# Patient Record
Sex: FEMALE | Race: WHITE | NOT HISPANIC OR LATINO | Employment: STUDENT | URBAN - METROPOLITAN AREA
[De-identification: names, ages, dates, MRNs, and addresses within clinical notes are randomized per-mention and may not be internally consistent; named-entity substitution may affect disease eponyms.]

---

## 2017-08-30 ENCOUNTER — OFFICE VISIT (OUTPATIENT)
Dept: OBSTETRICS AND GYNECOLOGY | Facility: CLINIC | Age: 27
End: 2017-08-30
Payer: COMMERCIAL

## 2017-08-30 VITALS
WEIGHT: 175.94 LBS | BODY MASS INDEX: 27.61 KG/M2 | SYSTOLIC BLOOD PRESSURE: 122 MMHG | HEIGHT: 67 IN | DIASTOLIC BLOOD PRESSURE: 76 MMHG

## 2017-08-30 DIAGNOSIS — Z30.41 ENCOUNTER FOR SURVEILLANCE OF CONTRACEPTIVE PILLS: ICD-10-CM

## 2017-08-30 DIAGNOSIS — E28.2 PCOS (POLYCYSTIC OVARIAN SYNDROME): ICD-10-CM

## 2017-08-30 DIAGNOSIS — Z01.419 ENCOUNTER FOR GYNECOLOGICAL EXAMINATION: Primary | ICD-10-CM

## 2017-08-30 PROCEDURE — 99395 PREV VISIT EST AGE 18-39: CPT | Mod: S$GLB,,, | Performed by: OBSTETRICS & GYNECOLOGY

## 2017-08-30 PROCEDURE — 99999 PR PBB SHADOW E&M-EST. PATIENT-LVL III: CPT | Mod: PBBFAC,,, | Performed by: OBSTETRICS & GYNECOLOGY

## 2017-08-30 RX ORDER — METFORMIN HYDROCHLORIDE 750 MG/1
750 TABLET, EXTENDED RELEASE ORAL
Qty: 30 TABLET | Refills: 11 | Status: SHIPPED | OUTPATIENT
Start: 2017-08-30 | End: 2018-09-20 | Stop reason: SDUPTHER

## 2017-08-30 RX ORDER — DROSPIRENONE AND ETHINYL ESTRADIOL 0.02-3(28)
1 KIT ORAL DAILY
Qty: 90 TABLET | Refills: 3 | Status: SHIPPED | OUTPATIENT
Start: 2017-08-30 | End: 2017-09-01 | Stop reason: SDUPTHER

## 2017-08-30 NOTE — PROGRESS NOTES
Subjective:       Patient ID: Aleisha Corrales is a 27 y.o. female.    Chief Complaint:  Well Woman (last pap negative 16, pt c/o migraines around the same time of menses for the last year)      History of Present Illness.  Aleisha Corrales is a 27 y.o. female.  She has no breast or urinary symptoms.  She has no postcoital bleeding, dysmenorrhea, pelvic pain, dyspareunia, vaginal dryness, vaginal discharge, or sexual complaints.  She is on Massiel and doesn't get a period but feels like she gets migraines when her period is due.  She does not take it continuously.    GYN & OB History  No LMP recorded. Patient is not currently having periods (Reason: Birth Control).   Date of Last Pap: 2016  Normal  Gardasil: Yes    OB History    Para Term  AB Living   0 0 0 0 0 0   SAB TAB Ectopic Multiple Live Births   0 0 0 0           Obstetric Comments   Age at menarche 13       Past Medical History:   Diagnosis Date    Migraine     PCOS (polycystic ovarian syndrome)      History reviewed. No pertinent surgical history.  Family History   Problem Relation Age of Onset    Breast cancer Maternal Grandmother     No Known Problems Father     Thyroid disease Mother     Parkinsonism Paternal Grandfather     Polymyalgia rheumatica Paternal Grandmother     Colon cancer Neg Hx     Diabetes Neg Hx     Eclampsia Neg Hx     Hypertension Neg Hx     Miscarriages / Stillbirths Neg Hx     Ovarian cancer Neg Hx      labor Neg Hx     Stroke Neg Hx      Social History   Substance Use Topics    Smoking status: Never Smoker    Smokeless tobacco: Never Used    Alcohol use Yes       Current Outpatient Prescriptions:     drospirenone-ethinyl estradiol (GIANVI, 28,) 3-0.02 mg per tablet, Take 1 tablet by mouth once daily. Do 4 months continuously, Disp: 90 tablet, Rfl: 3    LEVOCETIRIZINE DIHYDROCHLORIDE (LEVOCETIRIZINE ORAL), Take by mouth., Disp: , Rfl:     rizatriptan (MAXALT-MLT) 5 MG disintegrating tablet,  "once a day, Disp: , Rfl:     metformin (GLUCOPHAGE XR) 750 MG 24 hr tablet, Take 1 tablet (750 mg total) by mouth daily with breakfast., Disp: 30 tablet, Rfl: 11    Review of patient's allergies indicates:  No Known Allergies    Review of Systems  Review of Systems   Constitutional: Negative for fatigue.   HENT: Negative for trouble swallowing.    Eyes: Negative for visual disturbance.   Respiratory: Negative for cough and shortness of breath.    Cardiovascular: Negative for chest pain.   Gastrointestinal: Negative for abdominal distention, abdominal pain, blood in stool, nausea and vomiting.   Genitourinary: Negative for difficulty urinating, dyspareunia, dysuria, flank pain, frequency, hematuria, pelvic pain, urgency, vaginal bleeding, vaginal discharge and vaginal pain.   Musculoskeletal: Negative for arthralgias.   Skin: Negative for rash.   Neurological: Negative for dizziness and headaches.   Psychiatric/Behavioral: Negative for sleep disturbance. The patient is not nervous/anxious.         Objective:     Vitals:    08/30/17 1003   BP: 122/76   Weight: 79.8 kg (175 lb 14.8 oz)   Height: 5' 7" (1.702 m)   PainSc: 0-No pain     Body mass index is 27.55 kg/m².      Physical Exam:   Constitutional: She is oriented to person, place, and time. Vital signs are normal. She appears well-developed and well-nourished.    HENT:   Head: Normocephalic.     Neck: Normal range of motion. No thyromegaly present.     Pulmonary/Chest: Right breast exhibits no mass, no nipple discharge, no skin change, no tenderness and no swelling. Left breast exhibits no mass, no nipple discharge, no skin change, no tenderness and no swelling. Breasts are symmetrical.        Abdominal: Soft. Normal appearance and bowel sounds are normal. She exhibits no distension. There is no tenderness.     Genitourinary: Vagina normal and uterus normal. Pelvic exam was performed with patient supine. There is no rash, tenderness, lesion or injury on the " right labia. There is no rash, tenderness, lesion or injury on the left labia. Cervix is normal. Right adnexum displays no mass, no tenderness and no fullness. Left adnexum displays no mass, no tenderness and no fullness. No erythema in the vagina. No vaginal discharge found. Cervix exhibits no motion tenderness and no discharge.           Musculoskeletal: Normal range of motion.      Lymphadenopathy:        Right: No inguinal and no supraclavicular adenopathy present.        Left: No inguinal and no supraclavicular adenopathy present.    Neurological: She is alert and oriented to person, place, and time.    Skin: Skin is warm and dry.    Psychiatric: She has a normal mood and affect.        Assessment/ Plan:     Encounter for gynecological examination    Encounter for surveillance of contraceptive pills  -     drospirenone-ethinyl estradiol (GIANVI, 28,) 3-0.02 mg per tablet; Take 1 tablet by mouth once daily. Do 4 months continuously  Dispense: 90 tablet; Refill: 3    PCOS (polycystic ovarian syndrome)  -     metformin (GLUCOPHAGE XR) 750 MG 24 hr tablet; Take 1 tablet (750 mg total) by mouth daily with breakfast.  Dispense: 30 tablet; Refill: 11      Take Massiel continuously to see if that helps migraines.  Routine pap smears.  Self breast exam  Diet and exercise discussed.  Contraception reviewed/discussed.  Follow-up with me in 1 year.

## 2017-09-01 ENCOUNTER — TELEPHONE (OUTPATIENT)
Dept: OBSTETRICS AND GYNECOLOGY | Facility: CLINIC | Age: 27
End: 2017-09-01

## 2017-09-01 DIAGNOSIS — Z30.41 ENCOUNTER FOR SURVEILLANCE OF CONTRACEPTIVE PILLS: ICD-10-CM

## 2017-09-01 RX ORDER — DROSPIRENONE AND ETHINYL ESTRADIOL 0.02-3(28)
1 KIT ORAL DAILY
Qty: 28 TABLET | Refills: 0 | Status: SHIPPED | OUTPATIENT
Start: 2017-09-01

## 2017-09-01 NOTE — TELEPHONE ENCOUNTER
Pt said her pharm won't have her ocp rx until the end of next week, pt would like to know if it is okay to wait until then to start her new pack. She is supposed to start it on Monday.

## 2017-09-01 NOTE — TELEPHONE ENCOUNTER
Pt states that [facility] is shipping out her new pack on Monday but it won't get to her until the 6th. Pt is supposed to start her new pack on Monday. Advised pt that we can send in a one month supply to her local pharmacy so that she is not missing a whole week of pills. Pt verbalized understanding and pharmacy and allergies are UTD

## 2018-02-21 ENCOUNTER — TELEPHONE (OUTPATIENT)
Dept: NEUROLOGY | Facility: CLINIC | Age: 28
End: 2018-02-21

## 2018-02-21 NOTE — TELEPHONE ENCOUNTER
----- Message from Adwoa Goodrich sent at 2/21/2018  8:29 AM CST -----  Contact: self  Patient would like a call back from Elsa regarding a appointment. Please call patient at [phone]. Thanks!

## 2018-02-21 NOTE — TELEPHONE ENCOUNTER
Returned call and spoke with patient. Appointment scheduled with Dr. Beauchamp for 03/02 at 12 pm per Dr. Beauchamp. Patient verbalized understanding.

## 2018-03-02 ENCOUNTER — TELEPHONE (OUTPATIENT)
Dept: NEUROLOGY | Facility: CLINIC | Age: 28
End: 2018-03-02

## 2018-03-02 ENCOUNTER — OFFICE VISIT (OUTPATIENT)
Dept: NEUROLOGY | Facility: CLINIC | Age: 28
End: 2018-03-02
Payer: COMMERCIAL

## 2018-03-02 VITALS
RESPIRATION RATE: 16 BRPM | WEIGHT: 179.13 LBS | BODY MASS INDEX: 28.11 KG/M2 | DIASTOLIC BLOOD PRESSURE: 90 MMHG | HEART RATE: 77 BPM | SYSTOLIC BLOOD PRESSURE: 135 MMHG | HEIGHT: 67 IN

## 2018-03-02 DIAGNOSIS — G44.89 CHRONIC MIXED HEADACHE SYNDROME: ICD-10-CM

## 2018-03-02 PROCEDURE — 99999 PR PBB SHADOW E&M-EST. PATIENT-LVL III: CPT | Mod: PBBFAC,,, | Performed by: PSYCHIATRY & NEUROLOGY

## 2018-03-02 PROCEDURE — 99204 OFFICE O/P NEW MOD 45 MIN: CPT | Mod: S$GLB,,, | Performed by: PSYCHIATRY & NEUROLOGY

## 2018-03-02 RX ORDER — INDOMETHACIN 25 MG/1
25 CAPSULE ORAL 3 TIMES DAILY PRN
Qty: 10 CAPSULE | Refills: 3 | Status: SHIPPED | OUTPATIENT
Start: 2018-03-02

## 2018-03-02 RX ORDER — RIZATRIPTAN BENZOATE 10 MG/1
10 TABLET, ORALLY DISINTEGRATING ORAL
Qty: 9 TABLET | Refills: 4 | Status: SHIPPED | OUTPATIENT
Start: 2018-03-02 | End: 2018-06-05 | Stop reason: SDUPTHER

## 2018-03-02 NOTE — TELEPHONE ENCOUNTER
----- Message from Bárbara Trinh sent at 3/2/2018 10:08 AM CST -----  Aleisha, patient [phone], Returning the nurse's call about changing appointment to 1:30pm. She is confirming the change to 1:30pm, today. Please advise. Thanks.

## 2018-03-02 NOTE — PROGRESS NOTES
Headache questionnaire    1. When did your Headaches start?    14 years old      2. Where are your headaches located?   Eyes, mastoid process, top of head      3. Your headache's characteristics:   Excruciating,Stabbing      4. How long does the headache last?   hours      5. How often does the headache occur?   weekly      6. Are your headaches preceded or accompanied by other symptoms? yes   If yes, please describe.  See below      7. Does the headache awaken you at night? no   If so, how often? n/a        8. Please shanelle the word that best describes your headache's intensity:    severe      9. Using a scale of 1 through 10, with 0 = no pain and 10 = the worst pain:   What score is your headache now? 0   What score is your headache at its worst? 8   What score is your headache at its best? 0        10. Possible associated headache symptoms:  []  Sensitivity to light  [] Dizziness  [x] Nasal or sinus pressure/ pain   [] Sensitivity to noise  [] Vertigo  [x] Problems with concentration  [x] Sensitivity to smells  [] Ringing in ears  [] Problems with memory    [] Blurred vision  [] Irritability  [x] Problems with task completion   [] Double vision  [] Anger  []  Problems with relaxation  [] Loss of appetite  [] Anxiety  [x] Neck tightness, Neck pain  [x] Nausea   [] Nasal congestion  [x] Vomiting         11. Headache improving factors:  [x] Sleep  [] Heat  [] Darkness  [] Ice  [x] Local pressure [] Menses (period)  [x] Massage   [x] Medications:        12. Headache worsening factors:   [] Fatigue [] Sneezing  [] Changes in Weather  [] Light [] Bending Over [x] Stress  [] Noise [] Ovulation  [] Multiple Sclerosis Flare-Up  [] Smells  [x] Menses  [] Food   [] Coughing [x] Alcohol      13. Number of caffeinated drinks per day: 1-2      14. Number of diet drinks per day:  Sometimes 1      15. Have you seen any other [facility]ists within the last 3 years?  No          Please Check any Medications Tried for  Headache    AED Neuromodulators  MAOIs  Ergot Alkaloids    Acetazolamide (Diamox) [] Phenelzine (Nardil) [] Dihydroergotamine (Migranal) []   Carbamazepine (Tegretol) [] Tranylcypromine (Parnate) [] Ergotamine (Ergomar) []   Gabapentin (Neurontin) [] Antihistamine/Serotonergic  Triptans    Lacosamide (Vimpat) [] Cyproheptadine (Periactin) [] Almotriptan (Axert) []   Lamotrigine (Lamictal) [] Antihypertensives  Eletriptan (Relpax) []   Levatiracetam (Keppra) [] Atenolol (Tenormin) [] Frovatriptan (Frova) []   Oxcarbazepine (Trileptal) [] Bisoprostol (Zebeta) [] Naratriptan (Amerge) []   Phenobarbital [] Candesartan (Atacand) [] Rizatriptan (Maxalt) [x]   Phenytoin (Dilantin) [] Diltiazem (Cardizem) [] Sumatriptan (Imitrex) [x]   Pregabalin (Lyrica) [] Lisinopril (Prinivil, Zestril) [] Zolmitriptan (Zomig) []   Primidone (Mysoline) [] Metoprolol (Toprol) [] Combo Abortives    Tiagabine (Gabatril) [] Nadolol (Corgard) [] Butalbital and Acetaminophen (Bupap) []   Topiramate (Topamax)  (Trokendi) [] Nicardipine (Cardene) []     Vigabatrin (Sabril) [] Nimodipine (Nimotop) [] Butalbital, Acetaminophen, and caffeine (Fioricet) []   Valproic Acid (Depakote) (Divalproex Sodium) [] Propranolol (Inderal) []     Zonisamide (Zonegran) [] Telmisartan (Micardis) [] Butalbital, Aspirin, and caffeine (Fiorinal) []   Benzodiazepines  Timolol (Blocadren) []     Alprazolam (Xanax) [] Verapamil (Calan, Verelan) [] Butalbital, Caffeine, Acetaminophen, and Codeine (Fioricet with Codeine) []   Diazepam (Valium) [] NSAIDs      Lorazepam (Ativan) [] Acetaminophen (Tylenol) [x]     Clonazepam (Klonopin) [] Acetylsalicylic Acid (Aspirin) [] Butalbital, Caffeine, Aspirin, and Codeine  (Fiorinal with Codeine) []   Antidepressants  Diclofenac (Cambia) []     Amitriptyline (Elavil) [] Ibuprofen (Motrin) [x]     Desipramine (Norpramin) [] Indomethacin (Indocin) [] Aspirin, Caffeine, and Acetaminophen (Excedrin) (Goodys) [x]   Doxepin (Sinequan) []  Ketoprofen (Orudis) []     Fluoxetine (Prozac) [] Ketorolac (Toradol) [] Acetaminophen, Dichloralphenazone, and Isometheptene (Midrin) []   Imipramine (Tofranil) [] Naproxen (Anaprox) (Aleve) [x]     Nortriptyline (Pamelor) [] Meclofenamic Acid (Meclomen) []     Venlafaxine (Effexor) [] Meloxicam (Mobic) [] Aspirin, Salicylamide, and Caffeine (BC Powder) []

## 2018-03-02 NOTE — PATIENT INSTRUCTIONS
MRI of the brain W WO contrast    Increase Maxalt to 10 mg instead of 5 mg    Trial of Zomig NS 5 mg for rapid escalating headaches    Indocin 25 mg with snack for rapidly escalating headaches    May combine Indocin with both, Zomig or Maxalt for severe headaches    Wait 24 hours after trying Zomig to use Maxalt or after using Maxalt to try Zomig

## 2018-03-02 NOTE — PROGRESS NOTES
"Subjective:       Patient ID: Aleisha Corrales is a 27 y.o. female.    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 She has had chronic headaches since was 14 years old. She describes several types of headaches. She was diagnosed with migraine at the age of 20. At the time, the attacks occurred less than 1 per month, were associated with neck stiffness and pain in the shoulders as well as nausea. She identified stress as the main trigger. Later, her OB/Gyn recommended to take OCP continuously, skipping the placebo week. This had no effect on her headaches. For about a year, her migraines changed in frequency, having 1 to 5 attacks per month, and were more sudden as well as unilateral usually on the right side rarely on the left. She denies any autonomic symptoms or signs. The quality of the pain also changed to stabbing pain in the eye, pain around the mastoid process, and no correlation with stress. No preceding signs or symptoms. The headaches would come in "clusters" lasting a few days in a row.The duration of the headache is a few hours (with treatment).The last type of headache has been present for the last couple of months and it consists of pain referred to the vertex. Tylenol, Ibuprofen, Naproxen, Excedrin do not help. Maxalt 5 mg helps partially in that it decreases the pain so she can function. Her PMHx is significant for PCOS diagnosed in 2009 on Metformin. No recent changes in lifestyle or medications. She underwent chiropractic treatment about 2 years ago which helped with tension type headache but not migraine. There is strong positive family history in her paternal side affecting her mother, grandmother and cousin.    Please see details of headache characteristics below.  Headache questionnaire     1. When did your Headaches start?               14 years old        2. Where are your headaches located?         "      Eyes, mastoid process, top of head        3. Your headache's characteristics:              Excruciating,Stabbing        4. How long does the headache last?              hours        5. How often does the headache occur?              weekly        6. Are your headaches preceded or accompanied by other symptoms? yes              If yes, please describe.  See below        7. Does the headache awaken you at night? no              If so, how often? n/a           8. Please shanelle the word that best describes your headache's intensity:               severe        9. Using a scale of 1 through 10, with 0 = no pain and 10 = the worst pain:              What score is your headache now? 0              What score is your headache at its worst? 8              What score is your headache at its best? 0           10. Possible associated headache symptoms:  []  Sensitivity to light                  [] Dizziness                    [x] Nasal or sinus pressure/ pain              [] Sensitivity to noise                 [] Vertigo                        [x] Problems with concentration  [x] Sensitivity to smells   [] Ringing in ears           [] Problems with memory                        [] Blurred vision             [] Irritability                     [x] Problems with task completion   [] Double vision             [] Anger              []  Problems with relaxation  [] Loss of appetite                     [] Anxiety                       [x] Neck tightness, Neck pain  [x] Nausea                                   [] Nasal congestion  [x] Vomiting                                         11. Headache improving factors:  [x] Sleep              [] Heat  [] Darkness                    [] Ice  [x] Local pressure           [] Menses (period)  [x] Massage                    [x] Medications:          12. Headache worsening factors:   [] Fatigue           [] Sneezing                    [] Changes in Weather  [] Light   [] Bending Over [x]  Stress  [] Noise  [] Ovulation                    [] Multiple Sclerosis Flare-Up  [] Smells            [x] Menses                      [] Food   [] Coughing        [x] Alcohol        13. Number of caffeinated drinks per day: 1-2        14. Number of diet drinks per day:  Sometimes 1        Review of Systems   Constitutional: Positive for fatigue. Negative for activity change, appetite change and fever.   HENT: Negative for congestion, dental problem, hearing loss, sinus pressure, tinnitus, trouble swallowing and voice change.    Eyes: Negative for photophobia, pain, redness and visual disturbance.   Respiratory: Negative for cough, chest tightness and shortness of breath.    Cardiovascular: Negative for chest pain, palpitations and leg swelling.   Gastrointestinal: Negative for abdominal pain, blood in stool, nausea and vomiting.   Endocrine: Negative for cold intolerance and heat intolerance.   Genitourinary: Negative for difficulty urinating, frequency, menstrual problem and urgency.   Musculoskeletal: Negative for arthralgias, back pain, gait problem, joint swelling, myalgias, neck pain and neck stiffness.   Skin: Negative.    Neurological: Positive for headaches. Negative for dizziness, tremors, seizures, syncope, facial asymmetry, speech difficulty, weakness, light-headedness and numbness.   Hematological: Negative for adenopathy. Does not bruise/bleed easily.   Psychiatric/Behavioral: Negative for agitation, behavioral problems, confusion, decreased concentration, self-injury, sleep disturbance and suicidal ideas. The patient is not nervous/anxious and is not hyperactive.            Past Medical History:   Diagnosis Date    Migraine     PCOS (polycystic ovarian syndrome)      No past surgical history on file.  Family History   Problem Relation Age of Onset    Breast cancer Maternal Grandmother     No Known Problems Father     Thyroid disease Mother     Parkinsonism Paternal Grandfather     Polymyalgia  rheumatica Paternal Grandmother     Colon cancer Neg Hx     Diabetes Neg Hx     Eclampsia Neg Hx     Hypertension Neg Hx     Miscarriages / Stillbirths Neg Hx     Ovarian cancer Neg Hx      labor Neg Hx     Stroke Neg Hx      Social History     Social History    Marital status:      Spouse name: N/A    Number of children: N/A    Years of education: N/A     Occupational History    Not on file.     Social History Main Topics    Smoking status: Never Smoker    Smokeless tobacco: Never Used    Alcohol use Yes    Drug use: No    Sexual activity: Yes     Partners: Male     Birth control/ protection: Condom, OCP     Other Topics Concern    Not on file     Social History Narrative    No narrative on file     Review of patient's allergies indicates:  No Known Allergies    Current Outpatient Prescriptions:     drospirenone-ethinyl estradiol (GIANVI, 28,) 3-0.02 mg per tablet, Take 1 tablet by mouth once daily., Disp: 28 tablet, Rfl: 0    LEVOCETIRIZINE DIHYDROCHLORIDE (LEVOCETIRIZINE ORAL), Take by mouth., Disp: , Rfl:     metformin (GLUCOPHAGE XR) 750 MG 24 hr tablet, Take 1 tablet (750 mg total) by mouth daily with breakfast., Disp: 30 tablet, Rfl: 11    rizatriptan (MAXALT-MLT) 5 MG disintegrating tablet, once a day, Disp: , Rfl:       Objective:      Vitals:    18 1337   BP: (!) 135/90   Pulse: 77   Resp: 16     Body mass index is 28.05 kg/m².    Physical Exam    Constitutional:   She appears well-developed and well-nourished. She is well groomed    HENT:    Head: Atraumatic, oral and nasal mucosa intact  Eyes: Conjunctivae and EOM are normal. Pupils are equal, round, and reactive to light OU  Neck: Neck supple. No thyromegaly present  Musculoskeletal: Normal range of motion. No joint stiffness. No vertebral point tenderness  Skin: Skin is warm and dry  Psychiatric: Normal mood and affect     Neuro exam:    Mental status:  Awake, attentive, Alert, oriented to self, place, year  and month  Language function is intact    Cranial Nerves:  Smell was not formally evaluated  Cranial Nerves II - XII: intact  Pursuits were smooth, normal saccades, no nystagmus OU  Funduscopic exam - disc were flat and pink, no exudates or hemorrhages OU  Motor - facial movement was symmetrical and normal     Palate moved well and was symmetrical with normal palatal and oral sensation  Tongue movements were full    Coordination:     Rapid alternating movements and rapid finger tapping - normal bilaterally  Finger to nose - normal and symmetric bilaterally   Arm roll - smooth and symmetric   No intentional or positional tremor.     Motor:  Normal muscle bulk and symmetry. No fasciculations were noted    No pronator drift  Strength 5/5 bilaterally     Reflexes:  Tendon reflexes were 2 + at biceps, triceps, brachioradialis, patellar, and Achilles bilaterally  On plantar stimulation toes were down going bilaterally  No clonus was noted     Sensory: Intact to light touch, pin prick in all extremities.     Gait: Romberg absent. Normal gait. Normal arm swing and turns. Good tandem        Assessment and Plan   The patient meets criteria of tension type headache and migraine without aura. There has been a change in the habitual pattern now with elements of paroxysmal hemicrania but she does not meet criteria in terms of number and duration of attacks. Will obtain an MRi of the brain W WO and will optimize acute treatment. For rapidly escalating headaches she can use either Indocin 25 mg or Zomig NS or both combined.  Advised not use triptans within 24 hours of each other.  PCOS, stable on Metformin  Consider preventive treatment  I have discussed the side effects of the medications prescribed and the patient acknowledges understanding    Counseling:  More than 50% of the 45 minute encounter was spent face to face counseling the patient regarding current status and future plan of care as well as side of the medications. All  questions were answered to patient's satisfaction          Katey Beauchamp M.D  Medical Director, Headache and Facial Pain  [facility]

## 2018-05-15 ENCOUNTER — TELEPHONE (OUTPATIENT)
Dept: NEUROLOGY | Facility: CLINIC | Age: 28
End: 2018-05-15

## 2018-05-15 NOTE — TELEPHONE ENCOUNTER
----- Message from Liz Santana sent at 5/15/2018 12:57 PM CDT -----  Call [phone] / asking to schedule an mri ... Needs the orders

## 2018-05-17 DIAGNOSIS — G44.89 CHRONIC MIXED HEADACHE SYNDROME: ICD-10-CM

## 2018-05-25 DIAGNOSIS — G44.89 CHRONIC MIXED HEADACHE SYNDROME: Primary | ICD-10-CM

## 2018-05-28 ENCOUNTER — HOSPITAL ENCOUNTER (OUTPATIENT)
Dept: RADIOLOGY | Facility: HOSPITAL | Age: 28
Discharge: HOME | End: 2018-05-28
Attending: PSYCHIATRY & NEUROLOGY
Payer: COMMERCIAL

## 2018-05-28 DIAGNOSIS — G44.89 CHRONIC MIXED HEADACHE SYNDROME: ICD-10-CM

## 2018-05-28 PROCEDURE — 70553 MRI BRAIN STEM W/O & W/DYE: CPT | Mod: TC,PO

## 2018-05-28 PROCEDURE — A9585 GADOBUTROL INJECTION: HCPCS | Mod: PO | Performed by: PSYCHIATRY & NEUROLOGY

## 2018-05-28 PROCEDURE — 25500020 PHARM REV CODE 255: Mod: PO | Performed by: PSYCHIATRY & NEUROLOGY

## 2018-05-28 PROCEDURE — 70553 MRI BRAIN STEM W/O & W/DYE: CPT | Mod: 26,,, | Performed by: RADIOLOGY

## 2018-05-28 RX ORDER — GADOBUTROL 604.72 MG/ML
8 INJECTION INTRAVENOUS
Status: COMPLETED | OUTPATIENT
Start: 2018-05-28 | End: 2018-05-28

## 2018-05-28 RX ADMIN — GADOBUTROL 8 ML: 604.72 INJECTION INTRAVENOUS at 03:05

## 2018-05-29 ENCOUNTER — TELEPHONE (OUTPATIENT)
Dept: NEUROLOGY | Facility: CLINIC | Age: 28
End: 2018-05-29

## 2018-06-05 ENCOUNTER — OFFICE VISIT (OUTPATIENT)
Dept: NEUROLOGY | Facility: CLINIC | Age: 28
End: 2018-06-05
Payer: COMMERCIAL

## 2018-06-05 VITALS
BODY MASS INDEX: 28.89 KG/M2 | RESPIRATION RATE: 18 BRPM | HEIGHT: 67 IN | SYSTOLIC BLOOD PRESSURE: 121 MMHG | WEIGHT: 184.06 LBS | HEART RATE: 88 BPM | DIASTOLIC BLOOD PRESSURE: 78 MMHG

## 2018-06-05 DIAGNOSIS — E28.2 PCOS (POLYCYSTIC OVARIAN SYNDROME): ICD-10-CM

## 2018-06-05 DIAGNOSIS — G43.719 INTRACTABLE CHRONIC MIGRAINE WITHOUT AURA AND WITHOUT STATUS MIGRAINOSUS: ICD-10-CM

## 2018-06-05 DIAGNOSIS — G44.89 CHRONIC MIXED HEADACHE SYNDROME: Primary | ICD-10-CM

## 2018-06-05 PROCEDURE — 3008F BODY MASS INDEX DOCD: CPT | Mod: CPTII,S$GLB,, | Performed by: PSYCHIATRY & NEUROLOGY

## 2018-06-05 PROCEDURE — 99214 OFFICE O/P EST MOD 30 MIN: CPT | Mod: S$GLB,,, | Performed by: PSYCHIATRY & NEUROLOGY

## 2018-06-05 PROCEDURE — 99999 PR PBB SHADOW E&M-EST. PATIENT-LVL III: CPT | Mod: PBBFAC,,, | Performed by: PSYCHIATRY & NEUROLOGY

## 2018-06-05 RX ORDER — ONDANSETRON 4 MG/1
TABLET, FILM COATED ORAL
Qty: 12 TABLET | Refills: 3 | Status: SHIPPED | OUTPATIENT
Start: 2018-06-05

## 2018-06-05 RX ORDER — RIZATRIPTAN BENZOATE 10 MG/1
10 TABLET, ORALLY DISINTEGRATING ORAL
Qty: 9 TABLET | Refills: 4 | Status: SHIPPED | OUTPATIENT
Start: 2018-06-05 | End: 2019-06-21 | Stop reason: SDUPTHER

## 2018-06-05 RX ORDER — BUTALBITAL, ACETAMINOPHEN AND CAFFEINE 50; 325; 40 MG/1; MG/1; MG/1
1 TABLET ORAL EVERY 6 HOURS PRN
Qty: 12 TABLET | Refills: 3 | Status: SHIPPED | OUTPATIENT
Start: 2018-06-05 | End: 2018-07-05

## 2018-06-05 RX ORDER — SUMATRIPTAN SUCCINATE 3 MG/1
1 INJECTION, SOLUTION SUBCUTANEOUS
Qty: 12 SYRINGE | Refills: 3 | Status: SHIPPED | OUTPATIENT
Start: 2018-06-05

## 2018-06-05 NOTE — PROGRESS NOTES
"Subjective:       Patient ID: Aleisha Corrales is a 28 y.o. female.    Chief Complaint: Headache  INTERVAL HISTORY  The patient comes for follow up. In the interim, she has had an MRI which is normal. She is about the same, she is averaging at least 4 attacks per month, sometimes quite disabling, associated with N/V and significant disability. Indocin does not seem to help much. Maxalt is inconsistently effective. She states that the trial of Zomig nasal spray caused more nausea. In the past ODT zofran was associated with emesis, she put it under the tongue, not on it. In addition, she reports allodynia over the crown of the head. The only change in the headache pattern is that the onset is not as quickly during the last few months compared to previously reported. Otherwise information below is still accurate and current.    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 She has had chronic headaches since was 14 years old. She describes several types of headaches. She was diagnosed with migraine at the age of 20. At the time, the attacks occurred less than 1 per month, were associated with neck stiffness and pain in the shoulders as well as nausea. She identified stress as the main trigger. Later, her OB/Gyn recommended to take OCP continuously, skipping the placebo week. This had no effect on her headaches. For about a year, her migraines changed in frequency, having 1 to 5 attacks per month, and were more sudden as well as unilateral usually on the right side rarely on the left. She denies any autonomic symptoms or signs. The quality of the pain also changed to stabbing pain in the eye, pain around the mastoid process, and no correlation with stress. No preceding signs or symptoms. The headaches would come in "clusters" lasting a few days in a row.The duration of the headache is a few hours (with treatment).The last type of headache has been present " for the last couple of months and it consists of pain referred to the vertex. Tylenol, Ibuprofen, Naproxen, Excedrin do not help. Maxalt 5 mg helps partially in that it decreases the pain so she can function. Her PMHx is significant for PCOS diagnosed in 2009 on Metformin. No recent changes in lifestyle or medications. She underwent chiropractic treatment about 2 years ago which helped with tension type headache but not migraine. There is strong positive family history in her paternal side affecting her mother, grandmother and cousin.    Please see details of headache characteristics below.  Headache questionnaire     1. When did your Headaches start?               14 years old        2. Where are your headaches located?              Eyes, mastoid process, top of head        3. Your headache's characteristics:              Excruciating,Stabbing        4. How long does the headache last?              hours        5. How often does the headache occur?              weekly        6. Are your headaches preceded or accompanied by other symptoms? yes              If yes, please describe.  See below        7. Does the headache awaken you at night? no              If so, how often? n/a           8. Please shanelle the word that best describes your headache's intensity:               severe        9. Using a scale of 1 through 10, with 0 = no pain and 10 = the worst pain:              What score is your headache now? 0              What score is your headache at its worst? 8              What score is your headache at its best? 0           10. Possible associated headache symptoms:  []  Sensitivity to light                  [] Dizziness                    [x] Nasal or sinus pressure/ pain              [] Sensitivity to noise                 [] Vertigo                        [x] Problems with concentration  [x] Sensitivity to smells   [] Ringing in ears           [] Problems with memory                        [] Blurred vision              [] Irritability                     [x] Problems with task completion   [] Double vision             [] Anger              []  Problems with relaxation  [] Loss of appetite                     [] Anxiety                       [x] Neck tightness, Neck pain  [x] Nausea                                   [] Nasal congestion  [x] Vomiting                                         11. Headache improving factors:  [x] Sleep              [] Heat  [] Darkness                    [] Ice  [x] Local pressure           [] Menses (period)  [x] Massage                    [x] Medications:          12. Headache worsening factors:   [] Fatigue           [] Sneezing                    [] Changes in Weather  [] Light   [] Bending Over [x] Stress  [] Noise  [] Ovulation                    [] Multiple Sclerosis Flare-Up  [] Smells            [x] Menses                      [] Food   [] Coughing        [x] Alcohol        13. Number of caffeinated drinks per day: 1-2        14. Number of diet drinks per day:  Sometimes 1        Review of Systems   Constitutional: Positive for fatigue. Negative for activity change, appetite change and fever.   HENT: Negative for congestion, dental problem, hearing loss, sinus pressure, tinnitus, trouble swallowing and voice change.    Eyes: Negative for photophobia, pain, redness and visual disturbance.   Respiratory: Negative for cough, chest tightness and shortness of breath.    Cardiovascular: Negative for chest pain, palpitations and leg swelling.   Gastrointestinal: Negative for abdominal pain, blood in stool, nausea and vomiting.   Endocrine: Negative for cold intolerance and heat intolerance.   Genitourinary: Negative for difficulty urinating, frequency, menstrual problem and urgency.   Musculoskeletal: Negative for arthralgias, back pain, gait problem, joint swelling, myalgias, neck pain and neck stiffness.   Skin: Negative.    Neurological: Positive for headaches. Negative for dizziness,  tremors, seizures, syncope, facial asymmetry, speech difficulty, weakness, light-headedness and numbness.   Hematological: Negative for adenopathy. Does not bruise/bleed easily.   Psychiatric/Behavioral: Negative for agitation, behavioral problems, confusion, decreased concentration, self-injury, sleep disturbance and suicidal ideas. The patient is not nervous/anxious and is not hyperactive.            Past Medical History:   Diagnosis Date    Migraine     PCOS (polycystic ovarian syndrome)      No past surgical history on file.  Family History   Problem Relation Age of Onset    Breast cancer Maternal Grandmother     No Known Problems Father     Thyroid disease Mother     Parkinsonism Paternal Grandfather     Polymyalgia rheumatica Paternal Grandmother     Colon cancer Neg Hx     Diabetes Neg Hx     Eclampsia Neg Hx     Hypertension Neg Hx     Miscarriages / Stillbirths Neg Hx     Ovarian cancer Neg Hx      labor Neg Hx     Stroke Neg Hx      Social History     Social History    Marital status:      Spouse name: N/A    Number of children: N/A    Years of education: N/A     Occupational History    Not on file.     Social History Main Topics    Smoking status: Never Smoker    Smokeless tobacco: Never Used    Alcohol use Yes    Drug use: No    Sexual activity: Yes     Partners: Male     Birth control/ protection: Condom, OCP     Other Topics Concern    Not on file     Social History Narrative    No narrative on file     Review of patient's allergies indicates:  No Known Allergies    Current Outpatient Prescriptions:     drospirenone-ethinyl estradiol (GIANVI, 28,) 3-0.02 mg per tablet, Take 1 tablet by mouth once daily., Disp: 28 tablet, Rfl: 0    LEVOCETIRIZINE DIHYDROCHLORIDE (LEVOCETIRIZINE ORAL), Take by mouth., Disp: , Rfl:     metformin (GLUCOPHAGE XR) 750 MG 24 hr tablet, Take 1 tablet (750 mg total) by mouth daily with breakfast., Disp: 30 tablet, Rfl: 11     rizatriptan (MAXALT-MLT) 5 MG disintegrating tablet, once a day, Disp: , Rfl:       Objective:      Vitals:    06/05/18 1501   BP: 121/78   Pulse: 88   Resp: 18     Body mass index is 28.83 kg/m².      Physical Exam    Constitutional:   She appears well-developed and well-nourished. She is well groomed    HENT:    Head: Atraumatic, oral and nasal mucosa intact  Eyes: Conjunctivae and EOM are normal. Pupils are equal, round, and reactive to light OU  Neck: Neck supple. No thyromegaly present  Musculoskeletal: Normal range of motion. No joint stiffness. No vertebral point tenderness  Skin: Skin is warm and dry  Psychiatric: Normal mood and affect     Neuro exam:    Mental status:  Awake, attentive, Alert, oriented to self, place, year and month  Language function is intact    Cranial Nerves:  Smell was not formally evaluated  Cranial Nerves II - XII: intact  Pursuits were smooth, normal saccades, no nystagmus OU  Funduscopic exam - disc were flat and pink, no exudates or hemorrhages OU  Motor - facial movement was symmetrical and normal     Palate moved well and was symmetrical with normal palatal and oral sensation  Tongue movements were full    Coordination:     Rapid alternating movements and rapid finger tapping - normal bilaterally  Finger to nose - normal and symmetric bilaterally   Arm roll - smooth and symmetric   No intentional or positional tremor.     Motor:  Normal muscle bulk and symmetry. No fasciculations were noted    No pronator drift  Strength 5/5 bilaterally     Reflexes:  Tendon reflexes were 2 + at biceps, triceps, brachioradialis, patellar, and Achilles bilaterally  On plantar stimulation toes were down going bilaterally  No clonus was noted     Sensory: Intact to light touch, pin prick in all extremities.     Gait: Romberg absent. Normal gait. Normal arm swing and turns. Good tandem  Results for orders placed or performed during the hospital encounter of 05/28/18   MRI Brain W WO Contrast     Narrative    EXAMINATION:  MRI BRAIN W WO CONTRAST    CLINICAL HISTORY:  Headache, chronic, new features or neuro deficit;.  Other headache syndrome    TECHNIQUE:  Multiplanar multisequence MR imaging of the brain was performed before and after the administration of 8 mL Gadavist intravenous contrast.    COMPARISON:  None.    FINDINGS:  Intracranial compartment:    There is no acute intracranial abnormality.  Brain volume, ventricular size and position are normal.  There is no hemorrhage or mass/mass effect.  There are no regions of abnormal signal intensity in the brain.  There are no regions of restricted diffusion to suggest acute infarction.  There is no pathologic enhancement.  The basilar cisterns are open.  There is no abnormal extra-axial fluid collection.  Flow voids indicating patency are present in the major vessels at the base of the brain.  The cerebellar tonsils are just at the level of the foramen magnum in normal position.  The sellar structures are normal.  The orbits are grossly normal.  On whole brain imaging, the cerebellopontine angles and internal auditory canals are normal.    Skull/extracranial contents: Marrow signal intensity in the clivus and calvarium is grossly normal.  The included paranasal sinuses and mastoid air cells are clear.      Impression    1.  Normal imaging of the brain.  There is no acute abnormality.  There is no hemorrhage, mass/mass effect, acute infarction.  There is no pathologic enhancement.      Electronically signed by: Emiliano Lopez MD  Date:    05/28/2018  Time:    16:23         Assessment and Plan   The patient meets criteria of tension type headache and migraine without aura. MRi of the brain W WO normal.  Advised not use triptans within 24 hours of each other.  PCOS, stable on Metformin  Prevention:  Iamovig 1 SQ injection monthly (you will receive 2 samples)    Acute treatment:  Trial of Gammacore, non-invasive vagus nerve stimulator  For moderate attacks  start with Maxalt  For moderate attacks associated with Nausea, combine Maxalt and Zofran  If no relief after 2 doses of Maxalt, take 1 Fioricet  If headache persists the next day, use Zembrace plus Zofran if associated nausea  For severe attacks use Zembraze 3 mg SQ injection. May repeat in 1 hour  For severe attacks associated with nausea, combine Zembrace with Zofran    RTC in 12 weeks with headache diary  I have discussed the side effects of the medications prescribed and the patient acknowledges understanding    Counseling:  More than 50% of the 25 minute encounter was spent face to face counseling the patient regarding current status and future plan of care as well as side of the medications. All questions were answered to patient's satisfaction          Katey Beauchamp M.D  Medical Director, Headache and Facial Pain  [facility]

## 2018-06-05 NOTE — PATIENT INSTRUCTIONS
Prevention:  Iamovig 1 SQ injection monthly (you will receive 2 samples)    Acute treatment:  Trial of Gammacore, non-invasive vagus nerve stimulator  For moderate attacks start with Maxalt  For moderate attacks associated with Nausea, combine Maxalt and Zofran  If no relief after 2 doses of Maxalt, take 1 Fioricet  If headache persists the next day, use Zembrace plus Zofran if associated nausea  For severe attacks use Zembraze 3 mg SQ injection. May repeat in 1 hour  For severe attacks associated with nausea, combine Zembrace with Zofran    RTC in 12 weeks with headache diary

## 2018-06-11 ENCOUNTER — TELEPHONE (OUTPATIENT)
Dept: NEUROLOGY | Facility: CLINIC | Age: 28
End: 2018-06-11

## 2018-06-11 NOTE — TELEPHONE ENCOUNTER
----- Message from Ruthy Song sent at 6/11/2018  9:26 AM CDT -----  Contact: self  Patient says that she's going to keep [facility] as her primary pharmacy  Callback number [phone]

## 2018-06-13 ENCOUNTER — TELEPHONE (OUTPATIENT)
Dept: NEUROLOGY | Facility: CLINIC | Age: 28
End: 2018-06-13

## 2018-06-13 NOTE — TELEPHONE ENCOUNTER
----- Message from iLz Santana sent at 6/13/2018  1:05 PM CDT -----  Has question directions for Ondansetron tablets / call [phone] ref [id number]   [facility] - [place], [place] - [address] [place] [id number]  Phone: [phone] Fax: [phone]

## 2018-06-13 NOTE — TELEPHONE ENCOUNTER
Called pharmacy in regards to directions and dosage of Zofran. All questions and concerns answered. Pharmacist verbalized understanding.

## 2018-08-01 DIAGNOSIS — Z30.41 ENCOUNTER FOR SURVEILLANCE OF CONTRACEPTIVE PILLS: ICD-10-CM

## 2018-08-02 RX ORDER — DROSPIRENONE AND ETHINYL ESTRADIOL 0.02-3(28)
1 KIT ORAL DAILY
Qty: 112 TABLET | Refills: 0 | Status: SHIPPED | OUTPATIENT
Start: 2018-08-02 | End: 2018-10-31 | Stop reason: SDUPTHER

## 2018-09-11 ENCOUNTER — TELEPHONE (OUTPATIENT)
Dept: NEUROLOGY | Facility: CLINIC | Age: 28
End: 2018-09-11

## 2018-09-11 NOTE — TELEPHONE ENCOUNTER
----- Message from Albert Peterson sent at 9/11/2018  2:49 PM CDT -----  Contact: pt  Pt was given a sample of amovig and she would like to request a full Rx of it.  Please call pt to advise.    Call Back #: [phone]  Thanks      [facility] - [address] [address]  Phone: [phone] Fax: [phone]

## 2018-09-11 NOTE — TELEPHONE ENCOUNTER
Returned patient and informed her that when the sample form was sent off it had her pharmacy information on it also so it should automatically roll over. Patient verbalized understanding.

## 2018-09-20 DIAGNOSIS — E28.2 PCOS (POLYCYSTIC OVARIAN SYNDROME): ICD-10-CM

## 2018-09-20 RX ORDER — METFORMIN HYDROCHLORIDE 750 MG/1
TABLET, EXTENDED RELEASE ORAL
Qty: 30 TABLET | Refills: 11 | Status: SHIPPED | OUTPATIENT
Start: 2018-09-20

## 2018-10-31 DIAGNOSIS — Z30.41 ENCOUNTER FOR SURVEILLANCE OF CONTRACEPTIVE PILLS: ICD-10-CM

## 2018-11-01 RX ORDER — DROSPIRENONE AND ETHINYL ESTRADIOL 0.02-3(28)
KIT ORAL
Qty: 112 TABLET | Refills: 0 | Status: SHIPPED | OUTPATIENT
Start: 2018-11-01

## 2019-06-21 ENCOUNTER — PATIENT MESSAGE (OUTPATIENT)
Dept: NEUROLOGY | Facility: CLINIC | Age: 29
End: 2019-06-21

## 2019-06-21 DIAGNOSIS — G43.719 INTRACTABLE CHRONIC MIGRAINE WITHOUT AURA AND WITHOUT STATUS MIGRAINOSUS: Primary | ICD-10-CM

## 2019-06-21 RX ORDER — BUTALBITAL, ACETAMINOPHEN AND CAFFEINE 50; 325; 40 MG/1; MG/1; MG/1
1 TABLET ORAL EVERY 4 HOURS PRN
Qty: 12 TABLET | Refills: 11 | Status: SHIPPED | OUTPATIENT
Start: 2019-06-21 | End: 2019-07-21

## 2019-06-21 RX ORDER — RIZATRIPTAN BENZOATE 10 MG/1
10 TABLET, ORALLY DISINTEGRATING ORAL
Qty: 9 TABLET | Refills: 11 | Status: SHIPPED | OUTPATIENT
Start: 2019-06-21 | End: 2020-07-22

## 2019-07-02 DIAGNOSIS — G43.719 INTRACTABLE CHRONIC MIGRAINE WITHOUT AURA AND WITHOUT STATUS MIGRAINOSUS: Primary | ICD-10-CM

## 2019-07-02 NOTE — TELEPHONE ENCOUNTER
----- Message from Cheryl Samuel sent at 7/2/2019  4:14 PM CDT -----  Contact: Patient  Type: Needs Medical Advice    Who Called: Patient  Best Call Back Number: [phone] (home)   Additional Information: patient said her Rx was sent to the wrong pharm and it had the wrong dosage the whole order was not right would like a call  Back please to get this rectified the correct Pharmacy in [place], [place]  store numb [phone]. The medication is Aimovig

## 2019-07-02 NOTE — TELEPHONE ENCOUNTER
----- Message from Cheryl Samuel sent at 7/2/2019  4:14 PM CDT -----  Contact: Patient  Type: Needs Medical Advice    Who Called: Patient  Best Call Back Number: [phone] (home)   Additional Information: patient said her Rx was sent to the wrong pharm and it had the wrong dosage the whole order was not right would like a call  Back please to get this rectified the correct Pharmacy in [place], [place]  store [phone]. The medication is Aimovig

## 2020-07-13 DIAGNOSIS — G43.719 INTRACTABLE CHRONIC MIGRAINE WITHOUT AURA AND WITHOUT STATUS MIGRAINOSUS: ICD-10-CM

## 2020-07-13 RX ORDER — ERENUMAB-AOOE 70 MG/ML
70 INJECTION SUBCUTANEOUS
Qty: 1 ML | Refills: 11 | Status: SHIPPED | OUTPATIENT
Start: 2020-07-13 | End: 2020-10-14 | Stop reason: CLARIF

## 2020-08-18 ENCOUNTER — TELEPHONE (OUTPATIENT)
Dept: NEUROLOGY | Facility: CLINIC | Age: 30
End: 2020-08-18

## 2020-08-18 NOTE — TELEPHONE ENCOUNTER
----- Message from Hallie Guerreroza sent at 8/18/2020  2:20 PM CDT -----  Type:  RX Refill Request    Who Called:  Aleisha Abdullahi or New Rx:  refill  RX Name and Strength:  butalbital-acetaminophen-caffeine -40 mg (FIORICET, ESGIC) -40 mg per tablet  How is the patient currently taking it? (ex. 1XDay):  one every 4 hours as needed for pain  Is this a 30 day or 90 day RX:  30  Preferred Pharmacy with phone number:    [facility] - [address] [address]  Phone: [phone] Fax: [phone]    Local or Mail Order:  mail  Ordering Provider:  Dr Nito Patton Call Back Number:  [phone]  Additional Information:  thank you!

## 2020-08-18 NOTE — TELEPHONE ENCOUNTER
Called patient and left message that she needs to schedule a follow up appointment for the refill since she has not been seen since 2018.

## 2020-10-14 ENCOUNTER — OFFICE VISIT (OUTPATIENT)
Dept: NEUROLOGY | Facility: CLINIC | Age: 30
End: 2020-10-14
Payer: COMMERCIAL

## 2020-10-14 DIAGNOSIS — G43.719 INTRACTABLE CHRONIC MIGRAINE WITHOUT AURA AND WITHOUT STATUS MIGRAINOSUS: Primary | ICD-10-CM

## 2020-10-14 DIAGNOSIS — E28.2 PCOS (POLYCYSTIC OVARIAN SYNDROME): ICD-10-CM

## 2020-10-14 PROCEDURE — 99214 OFFICE O/P EST MOD 30 MIN: CPT | Mod: 95,,, | Performed by: PSYCHIATRY & NEUROLOGY

## 2020-10-14 PROCEDURE — 99214 PR OFFICE/OUTPT VISIT, EST, LEVL IV, 30-39 MIN: ICD-10-PCS | Mod: 95,,, | Performed by: PSYCHIATRY & NEUROLOGY

## 2020-10-14 RX ORDER — BUTALBITAL, ACETAMINOPHEN AND CAFFEINE 50; 325; 40 MG/1; MG/1; MG/1
TABLET ORAL
Qty: 30 TABLET | Refills: 0 | Status: SHIPPED | OUTPATIENT
Start: 2020-10-14

## 2020-10-14 RX ORDER — ERENUMAB-AOOE 70 MG/ML
70 INJECTION SUBCUTANEOUS
Qty: 1 SYRINGE | Refills: 11 | Status: SHIPPED | OUTPATIENT
Start: 2020-10-14 | End: 2022-06-13 | Stop reason: SDUPTHER

## 2020-10-14 NOTE — PROGRESS NOTES
Subjective:       Patient ID: Aleisha Corrales is a 30 y.o. female.    Chief Complaint: Headache    INTERVAL HISTORY 10/14/2020  The patient location is: Home  The chief complaint leading to consultation is: Migraine  Visit type: Virtual visit with synchronous audio and video  Total time spent with patient: 25 minutes  The patient was informed of the relationship between the physician and patient and notified that he or she may decline to receive medical services by telemedicine and may withdraw from such care at any time.    The patient presents for virtual follow up. She is significantly improved on AImovig which has put her in remission. However, the headaches are starting to come back as she is having trouble getting her refill. I sent it to the pharmacy last July 13th. Apparently, PA is still pending. Maxalt is now consistently effective. She states that the trial of Zomig nasal spray caused more nausea. In the past ODT zofran was associated with emesis, she put it under the tongue, not on it. In addition, she reports allodynia over the crown of the head. The only change in the headache pattern is that the onset is not as quickly during the last few months compared to previously reported. Otherwise information below is still accurate and current.    INTERVAL HISTORY  The patient comes for follow up. In the interim, she has had an MRI which is normal. She is about the same, she is averaging at least 4 attacks per month, sometimes quite disabling, associated with N/V and significant disability. Indocin does not seem to help much. Maxalt is inconsistently effective. She states that the trial of Zomig nasal spray caused more nausea. In the past ODT zofran was associated with emesis, she put it under the tongue, not on it. In addition, she reports allodynia over the crown of the head. The only change in the headache pattern is that the onset is not as quickly during the last few months compared to previously reported.  "Otherwise information below is still accurate and current.    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 She has had chronic headaches since was 14 years old. She describes several types of headaches. She was diagnosed with migraine at the age of 20. At the time, the attacks occurred less than 1 per month, were associated with neck stiffness and pain in the shoulders as well as nausea. She identified stress as the main trigger. Later, her OB/Gyn recommended to take OCP continuously, skipping the placebo week. This had no effect on her headaches. For about a year, her migraines changed in frequency, having 1 to 5 attacks per month, and were more sudden as well as unilateral usually on the right side rarely on the left. She denies any autonomic symptoms or signs. The quality of the pain also changed to stabbing pain in the eye, pain around the mastoid process, and no correlation with stress. No preceding signs or symptoms. The headaches would come in "clusters" lasting a few days in a row.The duration of the headache is a few hours (with treatment).The last type of headache has been present for the last couple of months and it consists of pain referred to the vertex. Tylenol, Ibuprofen, Naproxen, Excedrin do not help. Maxalt 5 mg helps partially in that it decreases the pain so she can function. Her PMHx is significant for PCOS diagnosed in 2009 on Metformin. No recent changes in lifestyle or medications. She underwent chiropractic treatment about 2 years ago which helped with tension type headache but not migraine. There is strong positive family history in her paternal side affecting her mother, grandmother and cousin.    Please see details of headache characteristics below.  Headache questionnaire     1. When did your Headaches start?               14 years old        2. Where are your headaches located?              Eyes, mastoid process, top of " head        3. Your headache's characteristics:              Excruciating,Stabbing        4. How long does the headache last?              hours        5. How often does the headache occur?              weekly        6. Are your headaches preceded or accompanied by other symptoms? yes              If yes, please describe.  See below        7. Does the headache awaken you at night? no              If so, how often? n/a           8. Please shanelle the word that best describes your headache's intensity:               severe        9. Using a scale of 1 through 10, with 0 = no pain and 10 = the worst pain:              What score is your headache now? 0              What score is your headache at its worst? 8              What score is your headache at its best? 0           10. Possible associated headache symptoms:  []  Sensitivity to light                  [] Dizziness                    [x] Nasal or sinus pressure/ pain              [] Sensitivity to noise                 [] Vertigo                        [x] Problems with concentration  [x] Sensitivity to smells   [] Ringing in ears           [] Problems with memory                        [] Blurred vision             [] Irritability                     [x] Problems with task completion   [] Double vision             [] Anger              []  Problems with relaxation  [] Loss of appetite                     [] Anxiety                       [x] Neck tightness, Neck pain  [x] Nausea                                   [] Nasal congestion  [x] Vomiting                                         11. Headache improving factors:  [x] Sleep              [] Heat  [] Darkness                    [] Ice  [x] Local pressure           [] Menses (period)  [x] Massage                    [x] Medications:          12. Headache worsening factors:   [] Fatigue           [] Sneezing                    [] Changes in Weather  [] Light   [] Bending Over [x] Stress  [] Noise  [] Ovulation                     [] Multiple Sclerosis Flare-Up  [] Smells            [x] Menses                      [] Food   [] Coughing        [x] Alcohol        13. Number of caffeinated drinks per day: 1-2        14. Number of diet drinks per day:  Sometimes 1        Review of Systems   Constitutional: Positive for fatigue. Negative for activity change, appetite change and fever.   HENT: Negative for congestion, dental problem, hearing loss, sinus pressure, tinnitus, trouble swallowing and voice change.    Eyes: Negative for photophobia, pain, redness and visual disturbance.   Respiratory: Negative for cough, chest tightness and shortness of breath.    Cardiovascular: Negative for chest pain, palpitations and leg swelling.   Gastrointestinal: Negative for abdominal pain, blood in stool, nausea and vomiting.   Endocrine: Negative for cold intolerance and heat intolerance.   Genitourinary: Negative for difficulty urinating, frequency, menstrual problem and urgency.   Musculoskeletal: Negative for arthralgias, back pain, gait problem, joint swelling, myalgias, neck pain and neck stiffness.   Skin: Negative.    Neurological: Positive for headaches. Negative for dizziness, tremors, seizures, syncope, facial asymmetry, speech difficulty, weakness, light-headedness and numbness.   Hematological: Negative for adenopathy. Does not bruise/bleed easily.   Psychiatric/Behavioral: Negative for agitation, behavioral problems, confusion, decreased concentration, self-injury, sleep disturbance and suicidal ideas. The patient is not nervous/anxious and is not hyperactive.            Past Medical History:   Diagnosis Date    Migraine     PCOS (polycystic ovarian syndrome)      No past surgical history on file.  Family History   Problem Relation Age of Onset    Breast cancer Maternal Grandmother     No Known Problems Father     Thyroid disease Mother     Parkinsonism Paternal Grandfather     Polymyalgia rheumatica Paternal Grandmother      Colon cancer Neg Hx     Diabetes Neg Hx     Eclampsia Neg Hx     Hypertension Neg Hx     Miscarriages / Stillbirths Neg Hx     Ovarian cancer Neg Hx      labor Neg Hx     Stroke Neg Hx      Social History     Social History    Marital status:      Spouse name: N/A    Number of children: N/A    Years of education: N/A     Occupational History    Not on file.     Social History Main Topics    Smoking status: Never Smoker    Smokeless tobacco: Never Used    Alcohol use Yes    Drug use: No    Sexual activity: Yes     Partners: Male     Birth control/ protection: Condom, OCP     Other Topics Concern    Not on file     Social History Narrative    No narrative on file     Review of patient's allergies indicates:  No Known Allergies    Current Outpatient Prescriptions:     drospirenone-ethinyl estradiol (GIANVI, 28,) 3-0.02 mg per tablet, Take 1 tablet by mouth once daily., Disp: 28 tablet, Rfl: 0    LEVOCETIRIZINE DIHYDROCHLORIDE (LEVOCETIRIZINE ORAL), Take by mouth., Disp: , Rfl:     metformin (GLUCOPHAGE XR) 750 MG 24 hr tablet, Take 1 tablet (750 mg total) by mouth daily with breakfast., Disp: 30 tablet, Rfl: 11    rizatriptan (MAXALT-MLT) 5 MG disintegrating tablet, once a day, Disp: , Rfl:       Objective:      Physical Exam    Constitutional:   She appears well-developed and well-nourished. She is well groomed    Neurological Exam:  General: well-developed, well-nourished, no distress  Mental status: Awake and alert  Speech language: No dysarthria or aphasia on conversation  Cranial nerves: Face symmetric  Motor: Moves all extremities well  Coordination: No ataxia. No tremor.        Results for orders placed or performed during the hospital encounter of 18   MRI Brain W WO Contrast    Narrative    EXAMINATION:  MRI BRAIN W WO CONTRAST    CLINICAL HISTORY:  Headache, chronic, new features or neuro deficit;.  Other headache syndrome    TECHNIQUE:  Multiplanar multisequence MR  imaging of the brain was performed before and after the administration of 8 mL Gadavist intravenous contrast.    COMPARISON:  None.    FINDINGS:  Intracranial compartment:    There is no acute intracranial abnormality.  Brain volume, ventricular size and position are normal.  There is no hemorrhage or mass/mass effect.  There are no regions of abnormal signal intensity in the brain.  There are no regions of restricted diffusion to suggest acute infarction.  There is no pathologic enhancement.  The basilar cisterns are open.  There is no abnormal extra-axial fluid collection.  Flow voids indicating patency are present in the major vessels at the base of the brain.  The cerebellar tonsils are just at the level of the foramen magnum in normal position.  The sellar structures are normal.  The orbits are grossly normal.  On whole brain imaging, the cerebellopontine angles and internal auditory canals are normal.    Skull/extracranial contents: Marrow signal intensity in the clivus and calvarium is grossly normal.  The included paranasal sinuses and mastoid air cells are clear.      Impression    1.  Normal imaging of the brain.  There is no acute abnormality.  There is no hemorrhage, mass/mass effect, acute infarction.  There is no pathologic enhancement.      Electronically signed by: Emiliano Lopez MD  Date:    05/28/2018  Time:    16:23         Assessment and Plan   The patient meets criteria of tension type headache and migraine without aura. MRi of the brain W WO normal.  Advised not use triptans within 24 hours of each other.  PCOS, stable on Metformin  Prevention:  Iamovig 70 SQ injection monthly (you will receive 2 samples)    Acute treatment:    For moderate attacks start with Maxalt    If no relief after 2 doses of Maxalt, take 1 Fioricet      RTC in 6 months  I have discussed the side effects of the medications prescribed and the patient acknowledges understanding    Counseling:  More than 50% of the 25  minute encounter was spent face to face counseling the patient regarding current status and future plan of care as well as side of the medications. All questions were answered to patient's satisfaction          Katey Beauchamp M.D  Medical Director, Headache and Facial Pain  [facility]

## 2021-05-12 ENCOUNTER — PATIENT MESSAGE (OUTPATIENT)
Dept: RESEARCH | Facility: HOSPITAL | Age: 31
End: 2021-05-12

## 2022-06-13 DIAGNOSIS — G43.719 INTRACTABLE CHRONIC MIGRAINE WITHOUT AURA AND WITHOUT STATUS MIGRAINOSUS: ICD-10-CM

## 2022-06-13 RX ORDER — ERENUMAB-AOOE 70 MG/ML
70 INJECTION SUBCUTANEOUS
Qty: 1 ML | Refills: 11 | Status: SHIPPED | OUTPATIENT
Start: 2022-06-13

## 2022-06-14 ENCOUNTER — TELEPHONE (OUTPATIENT)
Dept: PHARMACY | Facility: CLINIC | Age: 32
End: 2022-06-14
Payer: COMMERCIAL

## 2022-06-14 NOTE — TELEPHONE ENCOUNTER
Aimovig prescription has been APPROVED FROM 6/13/2022 TO 6/13/2023 with copayment of $0.       [facility] [place] @[phone] will reach out to patient for further correspondence.